# Patient Record
Sex: MALE | Race: ASIAN | NOT HISPANIC OR LATINO | URBAN - METROPOLITAN AREA
[De-identification: names, ages, dates, MRNs, and addresses within clinical notes are randomized per-mention and may not be internally consistent; named-entity substitution may affect disease eponyms.]

---

## 2020-05-12 ENCOUNTER — EMERGENCY (EMERGENCY)
Facility: HOSPITAL | Age: 25
LOS: 1 days | Discharge: HOME | End: 2020-05-12
Attending: EMERGENCY MEDICINE | Admitting: EMERGENCY MEDICINE
Payer: COMMERCIAL

## 2020-05-12 VITALS
HEART RATE: 81 BPM | DIASTOLIC BLOOD PRESSURE: 77 MMHG | HEIGHT: 71 IN | WEIGHT: 160.06 LBS | RESPIRATION RATE: 16 BRPM | SYSTOLIC BLOOD PRESSURE: 116 MMHG | OXYGEN SATURATION: 97 % | TEMPERATURE: 99 F

## 2020-05-12 LAB
ALBUMIN SERPL ELPH-MCNC: 4.5 G/DL — SIGNIFICANT CHANGE UP (ref 3.3–5)
ALP SERPL-CCNC: 100 U/L — SIGNIFICANT CHANGE UP (ref 40–120)
ALT FLD-CCNC: 27 U/L — SIGNIFICANT CHANGE UP (ref 12–78)
ANION GAP SERPL CALC-SCNC: 6 MMOL/L — SIGNIFICANT CHANGE UP (ref 5–17)
AST SERPL-CCNC: 17 U/L — SIGNIFICANT CHANGE UP (ref 15–37)
BASOPHILS # BLD AUTO: 0.04 K/UL — SIGNIFICANT CHANGE UP (ref 0–0.2)
BASOPHILS NFR BLD AUTO: 0.7 % — SIGNIFICANT CHANGE UP (ref 0–2)
BILIRUB SERPL-MCNC: 1 MG/DL — SIGNIFICANT CHANGE UP (ref 0.2–1.2)
BUN SERPL-MCNC: 16 MG/DL — SIGNIFICANT CHANGE UP (ref 7–23)
CALCIUM SERPL-MCNC: 9.3 MG/DL — SIGNIFICANT CHANGE UP (ref 8.5–10.1)
CHLORIDE SERPL-SCNC: 105 MMOL/L — SIGNIFICANT CHANGE UP (ref 96–108)
CO2 SERPL-SCNC: 30 MMOL/L — SIGNIFICANT CHANGE UP (ref 22–31)
COHGB MFR BLDV: <1 % — SIGNIFICANT CHANGE UP (ref 0–2)
CREAT SERPL-MCNC: 1.2 MG/DL — SIGNIFICANT CHANGE UP (ref 0.5–1.3)
EOSINOPHIL # BLD AUTO: 0.29 K/UL — SIGNIFICANT CHANGE UP (ref 0–0.5)
EOSINOPHIL NFR BLD AUTO: 5.2 % — SIGNIFICANT CHANGE UP (ref 0–6)
GLUCOSE SERPL-MCNC: 90 MG/DL — SIGNIFICANT CHANGE UP (ref 70–99)
HCT VFR BLD CALC: 45.5 % — SIGNIFICANT CHANGE UP (ref 39–50)
HGB BLD CALC-MCNC: 15.8 G/DL — SIGNIFICANT CHANGE UP (ref 13–17)
HGB BLD-MCNC: 15.9 G/DL — SIGNIFICANT CHANGE UP (ref 13–17)
IMM GRANULOCYTES NFR BLD AUTO: 0.2 % — SIGNIFICANT CHANGE UP (ref 0–1.5)
LYMPHOCYTES # BLD AUTO: 1.34 K/UL — SIGNIFICANT CHANGE UP (ref 1–3.3)
LYMPHOCYTES # BLD AUTO: 24.2 % — SIGNIFICANT CHANGE UP (ref 13–44)
MCHC RBC-ENTMCNC: 33.3 PG — SIGNIFICANT CHANGE UP (ref 27–34)
MCHC RBC-ENTMCNC: 34.9 GM/DL — SIGNIFICANT CHANGE UP (ref 32–36)
MCV RBC AUTO: 95.4 FL — SIGNIFICANT CHANGE UP (ref 80–100)
MONOCYTES # BLD AUTO: 0.36 K/UL — SIGNIFICANT CHANGE UP (ref 0–0.9)
MONOCYTES NFR BLD AUTO: 6.5 % — SIGNIFICANT CHANGE UP (ref 2–14)
NEUTROPHILS # BLD AUTO: 3.5 K/UL — SIGNIFICANT CHANGE UP (ref 1.8–7.4)
NEUTROPHILS NFR BLD AUTO: 63.2 % — SIGNIFICANT CHANGE UP (ref 43–77)
NRBC # BLD: 0 /100 WBCS — SIGNIFICANT CHANGE UP (ref 0–0)
PLATELET # BLD AUTO: 216 K/UL — SIGNIFICANT CHANGE UP (ref 150–400)
POTASSIUM SERPL-MCNC: 4.8 MMOL/L — SIGNIFICANT CHANGE UP (ref 3.5–5.3)
POTASSIUM SERPL-SCNC: 4.8 MMOL/L — SIGNIFICANT CHANGE UP (ref 3.5–5.3)
PROT SERPL-MCNC: 8 G/DL — SIGNIFICANT CHANGE UP (ref 6–8.3)
RBC # BLD: 4.77 M/UL — SIGNIFICANT CHANGE UP (ref 4.2–5.8)
RBC # FLD: 11.5 % — SIGNIFICANT CHANGE UP (ref 10.3–14.5)
SARS-COV-2 RNA SPEC QL NAA+PROBE: SIGNIFICANT CHANGE UP
SODIUM SERPL-SCNC: 141 MMOL/L — SIGNIFICANT CHANGE UP (ref 135–145)
WBC # BLD: 5.54 K/UL — SIGNIFICANT CHANGE UP (ref 3.8–10.5)
WBC # FLD AUTO: 5.54 K/UL — SIGNIFICANT CHANGE UP (ref 3.8–10.5)

## 2020-05-12 PROCEDURE — 80053 COMPREHEN METABOLIC PANEL: CPT

## 2020-05-12 PROCEDURE — 71045 X-RAY EXAM CHEST 1 VIEW: CPT

## 2020-05-12 PROCEDURE — 99283 EMERGENCY DEPT VISIT LOW MDM: CPT

## 2020-05-12 PROCEDURE — 96360 HYDRATION IV INFUSION INIT: CPT

## 2020-05-12 PROCEDURE — 93005 ELECTROCARDIOGRAM TRACING: CPT

## 2020-05-12 PROCEDURE — 93010 ELECTROCARDIOGRAM REPORT: CPT

## 2020-05-12 PROCEDURE — 99283 EMERGENCY DEPT VISIT LOW MDM: CPT | Mod: 25

## 2020-05-12 PROCEDURE — 85027 COMPLETE CBC AUTOMATED: CPT

## 2020-05-12 PROCEDURE — 36415 COLL VENOUS BLD VENIPUNCTURE: CPT

## 2020-05-12 PROCEDURE — 71045 X-RAY EXAM CHEST 1 VIEW: CPT | Mod: 26

## 2020-05-12 PROCEDURE — 87635 SARS-COV-2 COVID-19 AMP PRB: CPT

## 2020-05-12 PROCEDURE — 82375 ASSAY CARBOXYHB QUANT: CPT

## 2020-05-12 RX ORDER — SODIUM CHLORIDE 9 MG/ML
1000 INJECTION INTRAMUSCULAR; INTRAVENOUS; SUBCUTANEOUS ONCE
Refills: 0 | Status: COMPLETED | OUTPATIENT
Start: 2020-05-12 | End: 2020-05-12

## 2020-05-12 RX ADMIN — SODIUM CHLORIDE 1000 MILLILITER(S): 9 INJECTION INTRAMUSCULAR; INTRAVENOUS; SUBCUTANEOUS at 18:40

## 2020-05-12 RX ADMIN — SODIUM CHLORIDE 1000 MILLILITER(S): 9 INJECTION INTRAMUSCULAR; INTRAVENOUS; SUBCUTANEOUS at 17:40

## 2020-05-12 NOTE — ED PROVIDER NOTE - CHPI ED SYMPTOMS NEG
no decreased eating/drinking/no fever/no vomiting/no dizziness/no numbness/no chills/no nausea/no tingling/no weakness

## 2020-05-12 NOTE — ED ADULT NURSE NOTE - OBJECTIVE STATEMENT
Received the patient in the Er. Patient is alert and oriented. Skin warm and dry. S/P inhaled fumes while moving lawn 2 days ago. Patient is breathing normally. C/O burning sensation to chest.

## 2020-05-12 NOTE — ED PROVIDER NOTE - TEMPLATE, MLM
General
Anxiety    Gastroesophageal reflux disease, esophagitis presence not specified    Hypertension    Non-Hodgkin lymphoma    Sjogren's syndrome

## 2020-05-12 NOTE — ED PROVIDER NOTE - CLINICAL SUMMARY MEDICAL DECISION MAKING FREE TEXT BOX
pt with vague co of constant right cp, non pleuritic , ha and vague fatigue after fumes from gas [employer] 2 days ago.  will check labs, co, ekg, cxr and corona test, labs wnl.  pt improved with ivf.  dc, tylenol for pain,  home quarentine pending corona test, drink lots of fluids

## 2020-05-12 NOTE — ED PROVIDER NOTE - PROGRESS NOTE DETAILS
pt feeling better after ivf,  labs , ekg and cxr neg,  will d/c to [facility] pmd,  [place] pending  corona

## 2020-05-12 NOTE — ED PROVIDER NOTE - OBJECTIVE STATEMENT
Pt is a 26 yo male no sign hx.  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 pt states after began with vague ha and right chest pain, no sob, numbness, weakness, n/v, sob, f/c, pt does co vague fatigue.  pt states yesterday felt a bit better but today pt states sx back. no n/v, abd pain or laura other co. pt states has smell of gas in nose but thinks it may just be in his head, no known corona exposures but was out playing basketball a few days ago. Pt is a 24 yo male no sign hx.  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 pt states after began with vague ha and right chest pain, no sob, numbness, weakness, n/v, sob, f/c, pt does co vague fatigue.  pt states yesterday felt a bit better but today pt states sx back. no n/v, abd pain or laura other co. pt states has smell of gas in nose but thinks it may just be in his head, no known corona exposures but was out playing basketball a few days ago.  pt currently unemployeed

## 2020-05-12 NOTE — ED ADULT TRIAGE NOTE - CHIEF COMPLAINT QUOTE
pt p/w burning in chest, "disorientation", nausea and intermittent headaches x 1 week after inhaling "gas" from [employer].

## 2020-05-12 NOTE — ED PROVIDER NOTE - PATIENT PORTAL LINK FT
You can access the FollowMyHealth Patient Portal offered by [facility] by registering at the following website: [email or web address]. By joining [employer]’s FollowMyHealth portal, you will also be able to view your health information using other applications (apps) compatible with our system.

## 2020-11-24 ENCOUNTER — TRANSCRIPTION ENCOUNTER (OUTPATIENT)
Age: 25
End: 2020-11-24

## 2021-04-01 ENCOUNTER — TRANSCRIPTION ENCOUNTER (OUTPATIENT)
Age: 26
End: 2021-04-01

## 2021-04-09 ENCOUNTER — TRANSCRIPTION ENCOUNTER (OUTPATIENT)
Age: 26
End: 2021-04-09

## 2023-06-14 ENCOUNTER — EMERGENCY (EMERGENCY)
Facility: HOSPITAL | Age: 28
LOS: 1 days | Discharge: HOME | End: 2023-06-14
Attending: STUDENT IN AN ORGANIZED HEALTH CARE EDUCATION/TRAINING PROGRAM | Admitting: STUDENT IN AN ORGANIZED HEALTH CARE EDUCATION/TRAINING PROGRAM
Payer: COMMERCIAL

## 2023-06-14 VITALS
OXYGEN SATURATION: 100 % | DIASTOLIC BLOOD PRESSURE: 90 MMHG | TEMPERATURE: 98 F | SYSTOLIC BLOOD PRESSURE: 123 MMHG | HEART RATE: 64 BPM | WEIGHT: 169.98 LBS | RESPIRATION RATE: 78 BRPM | HEIGHT: 71 IN

## 2023-06-14 DIAGNOSIS — K61.1 RECTAL ABSCESS: ICD-10-CM

## 2023-06-14 PROBLEM — Z78.9 OTHER SPECIFIED HEALTH STATUS: Chronic | Status: ACTIVE | Noted: 2020-05-12

## 2023-06-14 LAB
ALBUMIN SERPL ELPH-MCNC: 4 G/DL — SIGNIFICANT CHANGE UP (ref 3.3–5)
ALP SERPL-CCNC: 76 U/L — SIGNIFICANT CHANGE UP (ref 40–120)
ALT FLD-CCNC: 25 U/L — SIGNIFICANT CHANGE UP (ref 12–78)
ANION GAP SERPL CALC-SCNC: 2 MMOL/L — LOW (ref 5–17)
AST SERPL-CCNC: 14 U/L — LOW (ref 15–37)
BASOPHILS # BLD AUTO: 0.02 K/UL — SIGNIFICANT CHANGE UP (ref 0–0.2)
BASOPHILS NFR BLD AUTO: 0.3 % — SIGNIFICANT CHANGE UP (ref 0–2)
BILIRUB SERPL-MCNC: 1.4 MG/DL — HIGH (ref 0.2–1.2)
BUN SERPL-MCNC: 12 MG/DL — SIGNIFICANT CHANGE UP (ref 7–23)
CALCIUM SERPL-MCNC: 9.2 MG/DL — SIGNIFICANT CHANGE UP (ref 8.5–10.1)
CHLORIDE SERPL-SCNC: 109 MMOL/L — HIGH (ref 96–108)
CO2 SERPL-SCNC: 30 MMOL/L — SIGNIFICANT CHANGE UP (ref 22–31)
CREAT SERPL-MCNC: 1 MG/DL — SIGNIFICANT CHANGE UP (ref 0.5–1.3)
EGFR: 104 ML/MIN/1.73M2 — SIGNIFICANT CHANGE UP
EOSINOPHIL # BLD AUTO: 0.14 K/UL — SIGNIFICANT CHANGE UP (ref 0–0.5)
EOSINOPHIL NFR BLD AUTO: 2.4 % — SIGNIFICANT CHANGE UP (ref 0–6)
GLUCOSE SERPL-MCNC: 90 MG/DL — SIGNIFICANT CHANGE UP (ref 70–99)
HCT VFR BLD CALC: 48.4 % — SIGNIFICANT CHANGE UP (ref 39–50)
HGB BLD-MCNC: 16.6 G/DL — SIGNIFICANT CHANGE UP (ref 13–17)
IMM GRANULOCYTES NFR BLD AUTO: 0.3 % — SIGNIFICANT CHANGE UP (ref 0–0.9)
LYMPHOCYTES # BLD AUTO: 1.36 K/UL — SIGNIFICANT CHANGE UP (ref 1–3.3)
LYMPHOCYTES # BLD AUTO: 23.6 % — SIGNIFICANT CHANGE UP (ref 13–44)
MCHC RBC-ENTMCNC: 33.1 PG — SIGNIFICANT CHANGE UP (ref 27–34)
MCHC RBC-ENTMCNC: 34.3 GM/DL — SIGNIFICANT CHANGE UP (ref 32–36)
MCV RBC AUTO: 96.4 FL — SIGNIFICANT CHANGE UP (ref 80–100)
MONOCYTES # BLD AUTO: 0.51 K/UL — SIGNIFICANT CHANGE UP (ref 0–0.9)
MONOCYTES NFR BLD AUTO: 8.9 % — SIGNIFICANT CHANGE UP (ref 2–14)
NEUTROPHILS # BLD AUTO: 3.71 K/UL — SIGNIFICANT CHANGE UP (ref 1.8–7.4)
NEUTROPHILS NFR BLD AUTO: 64.5 % — SIGNIFICANT CHANGE UP (ref 43–77)
NRBC # BLD: 0 /100 WBCS — SIGNIFICANT CHANGE UP (ref 0–0)
PLATELET # BLD AUTO: 223 K/UL — SIGNIFICANT CHANGE UP (ref 150–400)
POTASSIUM SERPL-MCNC: 4.1 MMOL/L — SIGNIFICANT CHANGE UP (ref 3.5–5.3)
POTASSIUM SERPL-SCNC: 4.1 MMOL/L — SIGNIFICANT CHANGE UP (ref 3.5–5.3)
PROT SERPL-MCNC: 7.5 G/DL — SIGNIFICANT CHANGE UP (ref 6–8.3)
RBC # BLD: 5.02 M/UL — SIGNIFICANT CHANGE UP (ref 4.2–5.8)
RBC # FLD: 11.8 % — SIGNIFICANT CHANGE UP (ref 10.3–14.5)
SODIUM SERPL-SCNC: 141 MMOL/L — SIGNIFICANT CHANGE UP (ref 135–145)
WBC # BLD: 5.76 K/UL — SIGNIFICANT CHANGE UP (ref 3.8–10.5)
WBC # FLD AUTO: 5.76 K/UL — SIGNIFICANT CHANGE UP (ref 3.8–10.5)

## 2023-06-14 PROCEDURE — 80053 COMPREHEN METABOLIC PANEL: CPT

## 2023-06-14 PROCEDURE — 74177 CT ABD & PELVIS W/CONTRAST: CPT | Mod: 26,MA

## 2023-06-14 PROCEDURE — 99284 EMERGENCY DEPT VISIT MOD MDM: CPT | Mod: 25

## 2023-06-14 PROCEDURE — 85025 COMPLETE CBC W/AUTO DIFF WBC: CPT

## 2023-06-14 PROCEDURE — 46040 I&D ISCHIORCT&/PERIRCT ABSC: CPT

## 2023-06-14 PROCEDURE — 74177 CT ABD & PELVIS W/CONTRAST: CPT | Mod: MA

## 2023-06-14 PROCEDURE — 99285 EMERGENCY DEPT VISIT HI MDM: CPT

## 2023-06-14 PROCEDURE — 36415 COLL VENOUS BLD VENIPUNCTURE: CPT

## 2023-06-14 RX ORDER — LIDOCAINE HYDROCHLORIDE AND EPINEPHRINE 10; 10 MG/ML; UG/ML
10 INJECTION, SOLUTION INFILTRATION; PERINEURAL ONCE
Refills: 0 | Status: COMPLETED | OUTPATIENT
Start: 2023-06-14 | End: 2023-06-14

## 2023-06-14 RX ORDER — KETOROLAC TROMETHAMINE 30 MG/ML
15 SYRINGE (ML) INJECTION ONCE
Refills: 0 | Status: DISCONTINUED | OUTPATIENT
Start: 2023-06-14 | End: 2023-06-14

## 2023-06-14 RX ADMIN — Medication 1 TABLET(S): at 16:36

## 2023-06-14 NOTE — ED PROVIDER NOTE - PATIENT PORTAL LINK FT
You can access the FollowMyHealth Patient Portal offered by [facility] by registering at the following website: [email or web address]. By joining [employer]’s FollowMyHealth portal, you will also be able to view your health information using other applications (apps) compatible with our system.

## 2023-06-14 NOTE — ED PROVIDER NOTE - PROGRESS NOTE DETAILS
seen by surgery, no palpable abscess on exam, waiting for colorectal attending to call back with recs abscess drained by surgery

## 2023-06-14 NOTE — CONSULT NOTE ADULT - PROBLEM SELECTOR RECOMMENDATION 9
- For I&D at bedside of abscess  - Wound cultures sent  - Wound packed with Iodoform, packing to be removed tomorrow evening  - Patient may shower tomorrow evening  - Augmentin for 10 days   - Patient to follow up with Dr. Chapman in 2 weeks

## 2023-06-14 NOTE — CONSULT NOTE ADULT - ASSESSMENT
28 year old male p/w rectal pain, CT scan findings suggestive of small 1cm x 1.3 cm perirectal abscess.  Afebrile, VSS.  Labs unremarkable.  ANDRIY reassuring as no fluctuance or masses appreciated.

## 2023-06-14 NOTE — ED PROVIDER NOTE - CARE PROVIDER_API CALL
Majo Chapman.  Colon/Rectal Surgery  [address], [place] [address]  Phone: [phone]  Fax: [phone]  Follow Up Time: 7-10 Days

## 2023-06-14 NOTE — ED ADULT NURSE NOTE - NS ED NOTE ABUSE SUSPICION NEGLECT YN
...Purposeful Rounding    Patient Location: Patient room    Patient willing to engage in conversation: No    Presentation/behavior: sleeping    Affect: sleeping    Concerns reported: no    PRN medications given: no    Environmental assessment: Room free from clutter and Clear path to bathroom     Fall prevention interventions in place: Yellow non-skid socks on    Daily Junior Fall Risk Score: 67    Daily Kendrick Fall Risk Score: 15 No

## 2023-06-14 NOTE — ED ADULT NURSE NOTE - CHIEF COMPLAINT QUOTE
I have a cyst in my buttock since last Wednesday.  I did ice it and I applied Preparation H on it, (I misread the label.  I don't think what I have is a hemorrhoid.  I think it is a cyst).  the pain persisted and now, I cannot even sit.  it has not popped, nor is it draining.  ( I would like to mention that the following Saturday, a dog licked my face and my face swelled.  so I think it lowered by immune system and make this condition worse)  denies any n/v/d.  denies any diarrhea.

## 2023-06-14 NOTE — CONSULT NOTE ADULT - SUBJECTIVE AND OBJECTIVE BOX
COLORECTAL SURGERY PA CONSULT NOTE:    CHIEF COMPLAINT:  Patient is a 28y old  Male who presents with a chief complaint of rectal pain    HPI:  Patient is a 28 year old male with PSHx appendectomy, presenting to [facility] with rectal pain.  Patient states pain started 1 week ago while at work.  He took over the counter pain meds and applied cream for hemorrhoids without relief.  Pain described as throbbing and located at the 10 o'clock position near the right gluteal cleft. Prior to this episode, patient was in his normal state of health.  Patient able to tolerating BMs.  Patient denies fevers, chills, n/v, change in bowel habits, urinary complaints.      PAST MEDICAL HISTORY:  PAST MEDICAL & SURGICAL HISTORY:  No pertinent past medical history      No significant past surgical history          PAST SURGICAL HISTORY:    REVIEW OF SYSTEMS:  General/Constitutional: No acute distress, no headache, weakness, fevers, or chills   HEENT: Denies auditory or visual changes/disturbances, no vertigo, no throat pain, no dysphagia    Neck: Denies neck pain/stiffness, denies swelling/lumps/hoarseness   Lymphatic: Denies lumps or swelling in the axillae, groin, or neck bilaterally   Respiratory: Denies cough/hemoptysis, denies wheezing/SOB/dyspnea  Cardiac: Denies chest pain, palpitations  Abdomen: Denies abdominal bloating/fullness, nausea or vomiting, denies abdominal pain  Perineum: rectal pain   Extremities: Denies sores, swelling, discoloration bilat UE/LE  Genitourinary: Denies urinary issues or complaints, denies dysuria/hematuria  Neuro: Denies weakness, paraesthesias, paralysis, syncope, loss of vision  Skin: Denies pruritus, pain, rashes  Psych: Denies hallucinations, visual disturbances, or depression    MEDICATIONS:  Home Medications:    MEDICATIONS  (STANDING):  amoxicillin  875 milliGRAM(s)/clavulanate 1 Tablet(s) Oral Once  lidocaine 1%/epinephrine 1:100,000 Inj 10 milliLiter(s) Local Injection Once    MEDICATIONS  (PRN):      ALLERGIES:  Allergies    No Known Allergies    Intolerances        SOCIAL HISTORY:  Social History:    Smoking: Yes [ ]  No [x]   ______pk yrs  ETOH  Yes [ ]  No [x]  Social [ ]  DRUGS:  Yes [ ]  No [x]  if so what______________    FAMILY HISTORY:  FAMILY HISTORY:  No pertinent family history in first degree relatives        VITAL SIGNS:  Vital Signs Last 24 Hrs  T(C): 36.7 (14 Jun 2023 10:09), Max: 36.7 (14 Jun 2023 10:09)  T(F): 98 (14 Jun 2023 10:09), Max: 98 (14 Jun 2023 10:09)  HR: 64 (14 Jun 2023 10:09) (64 - 64)  BP: 123/90 (14 Jun 2023 10:09) (123/90 - 123/90)  BP(mean): --  RR: 78 (14 Jun 2023 10:09) (78 - 78)  SpO2: 100% (14 Jun 2023 10:09) (100% - 100%)    Parameters below as of 14 Jun 2023 10:09  Patient On (Oxygen Delivery Method): room air        PHYSICAL EXAM:  General: No acute distress, appears comfortable, well nourished, well-groomed, appears stated age  Head, Eyes, Ears, Nose, Throat: Normal cephalic/atraumatic, anicteric, conjunctiva-non injected and moist, vision grossly intact, hearing grossly intact, no nasal discharge, ears and nose symmetrical and atraumatic.  Nasal, oral, and oropharyngeal mucosa pink moist with no evidence of ulceration  Neck: Supple, carotids have good upstroke, trachea in the midline, without JVD or thyromegaly  Lymphatic: No evidence of masses or lymphadenopathy in the head, neck, trunk, axillary, inguinal, or supraclavicular regions  Chest: Lungs are clear to P&A, no wheezing, no rales, no ronchi, with good inspiratory effort  Heart: Heart rhythm regular, no murmurs  Abdomen: Soft, non tender, good bowel sounds present in all four quadrants.  No guarding, rebound, and no peritoneal signs.  No evidence of hepatosplenomegaly.  No evidence of abdominal wall hernias.  Inguinal regions are unremarkable with no evidence of hernias.   Perineum: + tenderness to palpation at the 10 o'clock anus position.  No mass or fluctuance appreciated on ANDRIY.    Extremity: No swelling, or open sores, no gross deformities,  good range of motion, 2+ peripheral pulses bilat UE/LE, no edema,  negative Anup's sign, no lymphadenopathy  Neuro: Alert and oriented x3, motor and sensory intact  Psychiatric: Awake , alert, oriented x3 with an appropriate affect.   Skin: Good color, turgor, texture with no gross lesions, no eruptions, no rashes, no subcutaneous nodules and normal temperature.     LABS:                        16.6   5.76  )-----------( 223      ( 14 Jun 2023 11:12 )             48.4     06-14    141  |  109<H>  |  12  ----------------------------<  90  4.1   |  30  |  1.00    Ca    9.2      14 Jun 2023 11:12    TPro  7.5  /  Alb  4.0  /  TBili  1.4<H>  /  DBili  x   /  AST  14<L>  /  ALT  25  /  AlkPhos  76  06-14        LIVER FUNCTIONS - ( 14 Jun 2023 11:12 )  Alb: 4.0 g/dL / Pro: 7.5 g/dL / ALK PHOS: 76 U/L / ALT: 25 U/L / AST: 14 U/L / GGT: x               RADIOLOGY & ADDITIONAL STUDIES:    ACC: [id number] EXAM:  CT ABDOMEN AND PELVIS IC   ORDERED BY: LOUISA REYES     PROCEDURE DATE:  06/14/2023          INTERPRETATION:  CLINICAL INFORMATION: Rectal pain    COMPARISON: 4/2/2012    CONTRAST/COMPLICATIONS:  IV Contrast: Omnipaque 350  90cc administered   10 cc discarded  Oral Contrast: NONE  Complications: None reported at time of study completion    PROCEDURE:  CT of the Abdomen and Pelvis was performed.  Sagittal and coronal reformats were performed.    FINDINGS:  LOWER CHEST: Within normal limits.    LIVER: Within normal limits.  BILE DUCTS: Normal caliber.  GALLBLADDER: Within normal limits.  SPLEEN: Within normal limits.  PANCREAS: Within normal limits.  ADRENALS: Within normal limits.  KIDNEYS/URETERS: Within normal limits.    BLADDER: Within normal limits.  REPRODUCTIVE ORGANS: Prostate within normal limits.    BOWEL: No bowel obstruction. Appendix is not seen and clips in the right   lower quadrant suggest appendectomy. A small peripherally enhancing   collection is seen in the perirectal area in the right gluteal fold   measuring approximately 1.3 x 1.0 x 1.5 cm. This is seen on axial image   120 of series 2 and on coronal image 59  PERITONEUM: No ascites.  VESSELS: Within normal limits.  RETROPERITONEUM/LYMPH NODES: No lymphadenopathy.  ABDOMINAL WALL: Within normal limits.  BONES: Within normal limits.    IMPRESSION: Small right perirectal abscess    MARTAH REBOLLEDO MD; Attending Radiologist  This document has been electronically signed. Jun 14 2023  1:40PM

## 2023-06-14 NOTE — ED PROVIDER NOTE - PHYSICAL EXAMINATION
Gen: Well appearing in NAD  Head: NC/AT  Neck: trachea midline  abd nontneder  no pilonidal abscess, no redness, warmth, no swelling, rectal exam with tenderness but no palpable fluctuance or abscess   Resp:  No distress  Ext: no deformities  Neuro:  A&O appears non focal  Skin:  Warm and dry as visualized  Psych:  Normal affect and mood

## 2023-06-14 NOTE — ED ADULT NURSE NOTE - OBJECTIVE STATEMENT
Pt states that he developed rectal pain last Wednesday and thinks that he might have a cyst. Pt denies N, V, fever and chills.

## 2023-06-14 NOTE — ED PROVIDER NOTE - OBJECTIVE STATEMENT
27yo M otherwise healthy pw 1 week of rectal pain. pt states he felt a bump in perianal area. no fevers or chills or abd pain. pt states he has been using hemorrhoid cream and ice packs but no improvement. tylenol seemed to help a little.

## 2023-06-14 NOTE — ED ADULT TRIAGE NOTE - CHIEF COMPLAINT QUOTE
I have a cyst in my buttock since last Wednesday.  I did ice it and I applied Preparation H on it, (I misread the label.  I don't think what I have is a hemorrhoid.  I think it is a cyst).  the pain persisted and now, I cannot even sit.  it has not popped, nor is it draining.  ( I would like to mention that the following Saturday, a dog licked my face and my face swelled.  so I think it lowered by immune system and make this condition worse) I have a cyst in my buttock since last Wednesday.  I did ice it and I applied Preparation H on it, (I misread the label.  I don't think what I have is a hemorrhoid.  I think it is a cyst).  the pain persisted and now, I cannot even sit.  it has not popped, nor is it draining.  ( I would like to mention that the following Saturday, a dog licked my face and my face swelled.  so I think it lowered by immune system and make this condition worse)  denies any n/v/d. I have a cyst in my buttock since last Wednesday.  I did ice it and I applied Preparation H on it, (I misread the label.  I don't think what I have is a hemorrhoid.  I think it is a cyst).  the pain persisted and now, I cannot even sit.  it has not popped, nor is it draining.  ( I would like to mention that the following Saturday, a dog licked my face and my face swelled.  so I think it lowered by immune system and make this condition worse)  denies any n/v/d.  denies any diarrhea.

## 2023-06-14 NOTE — ED PROVIDER NOTE - CLINICAL SUMMARY MEDICAL DECISION MAKING FREE TEXT BOX
27yo M otherwise healthy pw 1 week of rectal pain. pt states he felt a bump in perianal area. no fevers or chills or abd pain. pt states he has been using hemorrhoid cream and ice packs but no improvement. tylenol seemed to help a little.   no obvious abscess or cysts on exam, given rectal tenderness will get CT to eval for perirectal abscess

## 2023-06-14 NOTE — ED PROVIDER NOTE - NSFOLLOWUPINSTRUCTIONS_ED_ALL_ED_FT
1. TAKE ALL MEDICATIONS AS DIRECTED.    2. FOR PAIN OR FEVER YOU CAN TAKE IBUPROFEN (MOTRIN, ADVIL) OR ACETAMINOPHEN (TYLENOL) AS NEEDED, AS DIRECTED ON PACKAGING.  3. FOLLOW UP WITH YOUR PRIMARY DOCTOR WITHIN 5 DAYS AS DIRECTED.  4. IF YOU HAD LABS OR IMAGING DONE, YOU WERE GIVEN COPIES OF ALL LABS AND/OR IMAGING RESULTS FROM YOUR ER VISIT--PLEASE TAKE THEM WITH YOU TO YOUR FOLLOW UP APPOINTMENTS.  5. IF NEEDED, CALL PATIENT ACCESS SERVICES AT [phone] TO FIND A PRIMARY CARE PHYSICIAN.  OR CALL [phone] TO MAKE AN APPOINTMENT WITH THE CLINIC.  6. RETURN TO THE ER FOR ANY WORSENING SYMPTOMS OR CONCERNS.    Take antibiotics twice a day for 10 days  Follow up with colorectal surgery        English    Anorectal Abscess  An abscess is an infected area that contains a collection of pus. An anorectal abscess is an abscess that is near the opening of the anus or around the rectum. Without treatment, an anorectal abscess can become larger and cause other problems, such as a more serious body-wide infection or pain, especially during bowel movements.    What are the causes?  This condition is caused by plugged glands or an infection in one of these areas:  The anus.  The area between the anus and the scrotum in males or between the anus and the vagina in females (perineum).  What increases the risk?  The following factors may make you more likely to develop this condition:  Diabetes or inflammatory bowel disease.  Having a body defense system (immune system) that is weak.  Engaging in anal sex.  Having a sexually transmitted infection (STI).  Certain kinds of cancer, such as rectal carcinoma, leukemia, or lymphoma.  What are the signs or symptoms?  The main symptom of this condition is pain. The pain may be a throbbing pain that gets worse during bowel movements. Other symptoms include:  Swelling and redness in the area of the abscess. The redness may go beyond the abscess and appear as a red streak on the skin.  A visible, painful lump, or a lump that can be felt when touched.  Bleeding or pus-like discharge from the area.  Fever.  General weakness.  Constipation.  Diarrhea.  How is this diagnosed?  This condition is diagnosed based on your medical history and a physical exam of the affected area.  This may involve examining the rectal area with a gloved hand (digital rectal exam).  Sometimes, the health care provider needs to look into the rectum using a probe, scope, or imaging test.  For women, it may require a careful vaginal exam.  How is this treated?  Treatment for this condition may include:  Incision and drainage surgery. This involves making an incision over the abscess to drain the pus.  Medicines, including antibiotic medicine, pain medicine, stool softeners, or laxatives.  Follow these instructions at home:  Medicines    Take over-the-counter and prescription medicines only as told by your health care provider.  If you were prescribed an antibiotic medicine, use it as told by your health care provider. Do not stop using the antibiotic even if you start to feel better.  Do not drive or use heavy machinery while taking prescription pain medicine.  Wound care    Two stitched wounds. One is normal. The other is red with pus and infected.  If gauze was used in the abscess, follow instructions from your health care provider about removing or changing the gauze. It can usually be removed in 2–3 days.  Wash your hands with soap and water before you remove or change your gauze. If soap and water are not available, use hand .  If one or more drains were placed in the abscess cavity, be careful not to pull at them. Your health care provider will tell you how long they need to remain in place.  Check your incision area every day for signs of infection. Check for:  More redness, swelling, or pain.  More fluid or blood.  Warmth.  Pus or a bad smell.  Managing pain, stiffness, and swelling    Bag of ice on a towel on the skin.  Take a sitz bath 3–4 times a day and after bowel movements. This will help reduce pain and swelling.  To relieve pain, try sitting:  On a heating pad with the setting on low.  On an inflatable donut-shaped cushion.  If directed, put ice on the affected area:  Put ice in a plastic bag.  Place a towel between your skin and the bag.  Leave the ice on for 20 minutes, 2–3 times a day.  General instructions    Follow any diet instructions given by your health care provider.  Keep all follow-up visits as told by your health care provider. This is important.  Contact a health care provider if you have:  Bleeding from your incision.  Pain, swelling, or redness that does not improve or gets worse.  Trouble passing stool or urine.  Symptoms that return after treatment.  Get help right away if you:  Have problems moving or using your legs.  Have severe or increasing pain.  Have swelling in the affected area that suddenly gets worse.  Have a large increase in bleeding or passing of pus.  Develop chills or a fever.  Summary  An anorectal abscess is an abscess that is near the opening of the anus or around the rectum. An abscess is an infected area that contains a collection of pus.  The main symptom of this condition is pain. It may be a throbbing pain that gets worse during bowel movements.  Treatment for an anorectal abscess may include surgery to drain the pus from the abscess. Medicines and sitz baths may also be a part of your treatment plan.  This information is not intended to replace advice given to you by your health care provider. Make sure you discuss any questions you have with your health care provider.    Document Revised: 05/03/2023 Document Reviewed: 07/06/2022  [employer]vier Patient Education © 2023 [employer] 1. TAKE ALL MEDICATIONS AS DIRECTED.    2. FOR PAIN OR FEVER YOU CAN TAKE IBUPROFEN (MOTRIN, ADVIL) OR ACETAMINOPHEN (TYLENOL) AS NEEDED, AS DIRECTED ON PACKAGING.  3. FOLLOW UP WITH YOUR PRIMARY DOCTOR WITHIN 5 DAYS AS DIRECTED.  4. IF YOU HAD LABS OR IMAGING DONE, YOU WERE GIVEN COPIES OF ALL LABS AND/OR IMAGING RESULTS FROM YOUR ER VISIT--PLEASE TAKE THEM WITH YOU TO YOUR FOLLOW UP APPOINTMENTS.  5. IF NEEDED, CALL PATIENT ACCESS SERVICES AT [phone] TO FIND A PRIMARY CARE PHYSICIAN.  OR CALL [phone] TO MAKE AN APPOINTMENT WITH THE CLINIC.  6. RETURN TO THE ER FOR ANY WORSENING SYMPTOMS OR CONCERNS.    Take antibiotics twice a day for 10 days  Keep wound dry and clean, change dressing daily   Follow up with colorectal surgery        English    Anorectal Abscess  An abscess is an infected area that contains a collection of pus. An anorectal abscess is an abscess that is near the opening of the anus or around the rectum. Without treatment, an anorectal abscess can become larger and cause other problems, such as a more serious body-wide infection or pain, especially during bowel movements.    What are the causes?  This condition is caused by plugged glands or an infection in one of these areas:  The anus.  The area between the anus and the scrotum in males or between the anus and the vagina in females (perineum).  What increases the risk?  The following factors may make you more likely to develop this condition:  Diabetes or inflammatory bowel disease.  Having a body defense system (immune system) that is weak.  Engaging in anal sex.  Having a sexually transmitted infection (STI).  Certain kinds of cancer, such as rectal carcinoma, leukemia, or lymphoma.  What are the signs or symptoms?  The main symptom of this condition is pain. The pain may be a throbbing pain that gets worse during bowel movements. Other symptoms include:  Swelling and redness in the area of the abscess. The redness may go beyond the abscess and appear as a red streak on the skin.  A visible, painful lump, or a lump that can be felt when touched.  Bleeding or pus-like discharge from the area.  Fever.  General weakness.  Constipation.  Diarrhea.  How is this diagnosed?  This condition is diagnosed based on your medical history and a physical exam of the affected area.  This may involve examining the rectal area with a gloved hand (digital rectal exam).  Sometimes, the health care provider needs to look into the rectum using a probe, scope, or imaging test.  For women, it may require a careful vaginal exam.  How is this treated?  Treatment for this condition may include:  Incision and drainage surgery. This involves making an incision over the abscess to drain the pus.  Medicines, including antibiotic medicine, pain medicine, stool softeners, or laxatives.  Follow these instructions at home:  Medicines    Take over-the-counter and prescription medicines only as told by your health care provider.  If you were prescribed an antibiotic medicine, use it as told by your health care provider. Do not stop using the antibiotic even if you start to feel better.  Do not drive or use heavy machinery while taking prescription pain medicine.  Wound care    Two stitched wounds. One is normal. The other is red with pus and infected.  If gauze was used in the abscess, follow instructions from your health care provider about removing or changing the gauze. It can usually be removed in 2–3 days.  Wash your hands with soap and water before you remove or change your gauze. If soap and water are not available, use hand .  If one or more drains were placed in the abscess cavity, be careful not to pull at them. Your health care provider will tell you how long they need to remain in place.  Check your incision area every day for signs of infection. Check for:  More redness, swelling, or pain.  More fluid or blood.  Warmth.  Pus or a bad smell.  Managing pain, stiffness, and swelling    Bag of ice on a towel on the skin.  Take a sitz bath 3–4 times a day and after bowel movements. This will help reduce pain and swelling.  To relieve pain, try sitting:  On a heating pad with the setting on low.  On an inflatable donut-shaped cushion.  If directed, put ice on the affected area:  Put ice in a plastic bag.  Place a towel between your skin and the bag.  Leave the ice on for 20 minutes, 2–3 times a day.  General instructions    Follow any diet instructions given by your health care provider.  Keep all follow-up visits as told by your health care provider. This is important.  Contact a health care provider if you have:  Bleeding from your incision.  Pain, swelling, or redness that does not improve or gets worse.  Trouble passing stool or urine.  Symptoms that return after treatment.  Get help right away if you:  Have problems moving or using your legs.  Have severe or increasing pain.  Have swelling in the affected area that suddenly gets worse.  Have a large increase in bleeding or passing of pus.  Develop chills or a fever.  Summary  An anorectal abscess is an abscess that is near the opening of the anus or around the rectum. An abscess is an infected area that contains a collection of pus.  The main symptom of this condition is pain. It may be a throbbing pain that gets worse during bowel movements.  Treatment for an anorectal abscess may include surgery to drain the pus from the abscess. Medicines and sitz baths may also be a part of your treatment plan.  This information is not intended to replace advice given to you by your health care provider. Make sure you discuss any questions you have with your health care provider.    Document Revised: 05/03/2023 Document Reviewed: 07/06/2022  [employer] Patient Education © 2023 [employer]

## 2023-06-14 NOTE — PROCEDURE NOTE - ADDITIONAL PROCEDURE DETAILS
Cruciate incision made, probed the wound with hemostat to break up loculations and expressed bloody fluid. used culture swab. washed wound with 10cc of saline x3, and packed wound with iodoform strip with gauze and tape over wound.